# Patient Record
Sex: FEMALE | Race: WHITE | NOT HISPANIC OR LATINO | URBAN - METROPOLITAN AREA
[De-identification: names, ages, dates, MRNs, and addresses within clinical notes are randomized per-mention and may not be internally consistent; named-entity substitution may affect disease eponyms.]

---

## 2017-04-04 ENCOUNTER — EMERGENCY (EMERGENCY)
Facility: HOSPITAL | Age: 47
LOS: 1 days | End: 2017-04-04
Payer: COMMERCIAL

## 2017-04-04 PROCEDURE — 99282 EMERGENCY DEPT VISIT SF MDM: CPT

## 2017-04-18 ENCOUNTER — OUTPATIENT (OUTPATIENT)
Dept: OUTPATIENT SERVICES | Facility: HOSPITAL | Age: 47
LOS: 1 days | Discharge: HOME | End: 2017-04-18

## 2017-04-29 ENCOUNTER — OUTPATIENT (OUTPATIENT)
Dept: OUTPATIENT SERVICES | Facility: HOSPITAL | Age: 47
LOS: 1 days | End: 2017-04-29
Payer: COMMERCIAL

## 2017-04-29 PROCEDURE — 73721 MRI JNT OF LWR EXTRE W/O DYE: CPT | Mod: 26,RT

## 2017-08-01 ENCOUNTER — OUTPATIENT (OUTPATIENT)
Dept: OUTPATIENT SERVICES | Facility: HOSPITAL | Age: 47
LOS: 1 days | End: 2017-08-01

## 2018-08-14 ENCOUNTER — APPOINTMENT (OUTPATIENT)
Dept: VASCULAR SURGERY | Facility: CLINIC | Age: 48
End: 2018-08-14
Payer: COMMERCIAL

## 2018-08-14 DIAGNOSIS — Z87.442 PERSONAL HISTORY OF URINARY CALCULI: ICD-10-CM

## 2018-08-14 PROCEDURE — 99214 OFFICE O/P EST MOD 30 MIN: CPT

## 2018-08-15 PROBLEM — Z87.442 HISTORY OF RENAL CALCULI: Status: RESOLVED | Noted: 2018-08-15 | Resolved: 2018-08-15

## 2018-08-15 RX ORDER — OXYBUTYNIN CHLORIDE 15 MG/1
15 TABLET, EXTENDED RELEASE ORAL
Refills: 0 | Status: ACTIVE | COMMUNITY

## 2018-08-15 RX ORDER — ASPIRIN 81 MG
81 TABLET, DELAYED RELEASE (ENTERIC COATED) ORAL
Refills: 0 | Status: ACTIVE | COMMUNITY

## 2019-04-09 ENCOUNTER — APPOINTMENT (OUTPATIENT)
Dept: VASCULAR SURGERY | Facility: CLINIC | Age: 49
End: 2019-04-09
Payer: COMMERCIAL

## 2019-04-09 PROCEDURE — 99214 OFFICE O/P EST MOD 30 MIN: CPT

## 2019-04-10 ENCOUNTER — OUTPATIENT (OUTPATIENT)
Dept: OUTPATIENT SERVICES | Facility: HOSPITAL | Age: 49
LOS: 1 days | Discharge: HOME | End: 2019-04-10

## 2019-04-15 NOTE — REVIEW OF SYSTEMS
[As Noted in HPI] : as noted in HPI [Difficulty Walking] : difficulty walking [Negative] : Heme/Lymph [Leg Claudication] : no intermittent leg claudication [Lower Ext Edema] : no lower extremity edema [Limb Pain] : no limb pain [Limb Swelling] : no limb swelling

## 2019-04-15 NOTE — HISTORY OF PRESENT ILLNESS
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  Over the last few years Mrs. Mccauley's walking has become more difficult due to loss of ROM in the ankle and contraction of the R knee.  Pt has met w/a prosthetist (Zoe), and is ready to plan her R BKA.\par \par She is doing well otherwise, she is in good health, no other medical problems.

## 2019-04-15 NOTE — ASSESSMENT
[Arterial/Venous Disease] : arterial/venous disease [FreeTextEntry1] : 48yoF w/h/o RLE AVM, s/p multiple embolizations, but now reporting deterioration of the RLE regarding strength/ROM, and reports worsening difficulty w/walking.  She is unable to use any higher lifts in the LLE, so her limb length discrepancy has diminished her capacity to walk/exercise. Will plan for R BKA 05/23/2019.\par \par I personally discussed this patient with the Physician Assistant at the time of the visit. I agree with the assessment and plan as written

## 2019-04-15 NOTE — PHYSICAL EXAM
[Normal Breath Sounds] : Normal breath sounds [Normal Heart Sounds] : normal heart sounds [2+] : left 2+ [Calm] : calm [Alert] : alert [JVD] : no jugular venous distention  [Ankle Swelling (On Exam)] : not present [Varicose Veins Of Lower Extremities] : not present [] : not present [Abdomen Tenderness] : ~T ~M No abdominal tenderness [de-identified] : WN/WD, NAD [de-identified] : NC/AT [de-identified] : R LE: decreased ROM of ankle/foot [de-identified] : R LE: + multiple AVMs over thigh and lower leg

## 2019-04-25 ENCOUNTER — OUTPATIENT (OUTPATIENT)
Dept: OUTPATIENT SERVICES | Facility: HOSPITAL | Age: 49
LOS: 1 days | Discharge: HOME | End: 2019-04-25

## 2019-06-05 ENCOUNTER — RESULT REVIEW (OUTPATIENT)
Age: 49
End: 2019-06-05

## 2019-06-05 ENCOUNTER — APPOINTMENT (OUTPATIENT)
Dept: VASCULAR SURGERY | Facility: HOSPITAL | Age: 49
End: 2019-06-05

## 2019-06-05 ENCOUNTER — INPATIENT (INPATIENT)
Facility: HOSPITAL | Age: 49
LOS: 2 days | Discharge: NURSING, REHAB OR LONG-TERM CARE | DRG: 240 | End: 2019-06-08
Attending: SURGERY | Admitting: SURGERY
Payer: COMMERCIAL

## 2019-06-05 VITALS
HEIGHT: 61 IN | DIASTOLIC BLOOD PRESSURE: 65 MMHG | TEMPERATURE: 98 F | SYSTOLIC BLOOD PRESSURE: 126 MMHG | OXYGEN SATURATION: 100 % | WEIGHT: 108.03 LBS | HEART RATE: 63 BPM | RESPIRATION RATE: 16 BRPM

## 2019-06-05 DIAGNOSIS — N20.0 CALCULUS OF KIDNEY: Chronic | ICD-10-CM

## 2019-06-05 DIAGNOSIS — Z94.89 OTHER TRANSPLANTED ORGAN AND TISSUE STATUS: Chronic | ICD-10-CM

## 2019-06-05 LAB
ANION GAP SERPL CALC-SCNC: 13 MMOL/L — SIGNIFICANT CHANGE UP (ref 5–17)
BASOPHILS # BLD AUTO: 0.05 K/UL — SIGNIFICANT CHANGE UP (ref 0–0.2)
BASOPHILS NFR BLD AUTO: 0.8 % — SIGNIFICANT CHANGE UP (ref 0–2)
BUN SERPL-MCNC: 15 MG/DL — SIGNIFICANT CHANGE UP (ref 7–23)
CALCIUM SERPL-MCNC: 9 MG/DL — SIGNIFICANT CHANGE UP (ref 8.4–10.5)
CHLORIDE SERPL-SCNC: 103 MMOL/L — SIGNIFICANT CHANGE UP (ref 96–108)
CO2 SERPL-SCNC: 20 MMOL/L — LOW (ref 22–31)
CREAT SERPL-MCNC: 0.53 MG/DL — SIGNIFICANT CHANGE UP (ref 0.5–1.3)
EOSINOPHIL # BLD AUTO: 0.04 K/UL — SIGNIFICANT CHANGE UP (ref 0–0.5)
EOSINOPHIL NFR BLD AUTO: 0.7 % — SIGNIFICANT CHANGE UP (ref 0–6)
GLUCOSE SERPL-MCNC: 88 MG/DL — SIGNIFICANT CHANGE UP (ref 70–99)
HCT VFR BLD CALC: 37.1 % — SIGNIFICANT CHANGE UP (ref 34.5–45)
HGB BLD-MCNC: 12.1 G/DL — SIGNIFICANT CHANGE UP (ref 11.5–15.5)
IMM GRANULOCYTES NFR BLD AUTO: 0.3 % — SIGNIFICANT CHANGE UP (ref 0–1.5)
LYMPHOCYTES # BLD AUTO: 0.46 K/UL — LOW (ref 1–3.3)
LYMPHOCYTES # BLD AUTO: 7.8 % — LOW (ref 13–44)
MAGNESIUM SERPL-MCNC: 2 MG/DL — SIGNIFICANT CHANGE UP (ref 1.6–2.6)
MCHC RBC-ENTMCNC: 31.5 PG — SIGNIFICANT CHANGE UP (ref 27–34)
MCHC RBC-ENTMCNC: 32.6 GM/DL — SIGNIFICANT CHANGE UP (ref 32–36)
MCV RBC AUTO: 96.6 FL — SIGNIFICANT CHANGE UP (ref 80–100)
MONOCYTES # BLD AUTO: 0.1 K/UL — SIGNIFICANT CHANGE UP (ref 0–0.9)
MONOCYTES NFR BLD AUTO: 1.7 % — LOW (ref 2–14)
NEUTROPHILS # BLD AUTO: 5.26 K/UL — SIGNIFICANT CHANGE UP (ref 1.8–7.4)
NEUTROPHILS NFR BLD AUTO: 88.7 % — HIGH (ref 43–77)
NRBC # BLD: 0 /100 WBCS — SIGNIFICANT CHANGE UP (ref 0–0)
PHOSPHATE SERPL-MCNC: 2.7 MG/DL — SIGNIFICANT CHANGE UP (ref 2.5–4.5)
PLATELET # BLD AUTO: 223 K/UL — SIGNIFICANT CHANGE UP (ref 150–400)
POTASSIUM SERPL-MCNC: 3.8 MMOL/L — SIGNIFICANT CHANGE UP (ref 3.5–5.3)
POTASSIUM SERPL-SCNC: 3.8 MMOL/L — SIGNIFICANT CHANGE UP (ref 3.5–5.3)
RBC # BLD: 3.84 M/UL — SIGNIFICANT CHANGE UP (ref 3.8–5.2)
RBC # FLD: 12.3 % — SIGNIFICANT CHANGE UP (ref 10.3–14.5)
SODIUM SERPL-SCNC: 136 MMOL/L — SIGNIFICANT CHANGE UP (ref 135–145)
WBC # BLD: 5.93 K/UL — SIGNIFICANT CHANGE UP (ref 3.8–10.5)
WBC # FLD AUTO: 5.93 K/UL — SIGNIFICANT CHANGE UP (ref 3.8–10.5)

## 2019-06-05 PROCEDURE — 27880 AMPUTATION OF LOWER LEG: CPT | Mod: GC

## 2019-06-05 RX ORDER — OXYBUTYNIN CHLORIDE 5 MG
1 TABLET ORAL
Qty: 0 | Refills: 0 | DISCHARGE

## 2019-06-05 RX ORDER — ASPIRIN/CALCIUM CARB/MAGNESIUM 324 MG
1 TABLET ORAL
Qty: 0 | Refills: 0 | DISCHARGE

## 2019-06-05 RX ORDER — HYDROMORPHONE HYDROCHLORIDE 2 MG/ML
0.2 INJECTION INTRAMUSCULAR; INTRAVENOUS; SUBCUTANEOUS
Refills: 0 | Status: DISCONTINUED | OUTPATIENT
Start: 2019-06-05 | End: 2019-06-07

## 2019-06-05 RX ORDER — OXYBUTYNIN CHLORIDE 5 MG
5 TABLET ORAL THREE TIMES A DAY
Refills: 0 | Status: DISCONTINUED | OUTPATIENT
Start: 2019-06-05 | End: 2019-06-08

## 2019-06-05 RX ORDER — SODIUM CHLORIDE 9 MG/ML
1000 INJECTION, SOLUTION INTRAVENOUS
Refills: 0 | Status: DISCONTINUED | OUTPATIENT
Start: 2019-06-05 | End: 2019-06-06

## 2019-06-05 RX ORDER — OXYCODONE AND ACETAMINOPHEN 5; 325 MG/1; MG/1
2 TABLET ORAL EVERY 6 HOURS
Refills: 0 | Status: DISCONTINUED | OUTPATIENT
Start: 2019-06-05 | End: 2019-06-08

## 2019-06-05 RX ORDER — OXYCODONE AND ACETAMINOPHEN 5; 325 MG/1; MG/1
1 TABLET ORAL EVERY 4 HOURS
Refills: 0 | Status: DISCONTINUED | OUTPATIENT
Start: 2019-06-05 | End: 2019-06-08

## 2019-06-05 RX ORDER — DOCUSATE SODIUM 100 MG
100 CAPSULE ORAL THREE TIMES A DAY
Refills: 0 | Status: DISCONTINUED | OUTPATIENT
Start: 2019-06-05 | End: 2019-06-08

## 2019-06-05 RX ORDER — ASPIRIN/CALCIUM CARB/MAGNESIUM 324 MG
81 TABLET ORAL DAILY
Refills: 0 | Status: DISCONTINUED | OUTPATIENT
Start: 2019-06-05 | End: 2019-06-08

## 2019-06-05 RX ORDER — HYDROMORPHONE HYDROCHLORIDE 2 MG/ML
0.5 INJECTION INTRAMUSCULAR; INTRAVENOUS; SUBCUTANEOUS EVERY 4 HOURS
Refills: 0 | Status: DISCONTINUED | OUTPATIENT
Start: 2019-06-05 | End: 2019-06-08

## 2019-06-05 RX ORDER — BUPIVACAINE 13.3 MG/ML
20 INJECTION, SUSPENSION, LIPOSOMAL INFILTRATION ONCE
Refills: 0 | Status: DISCONTINUED | OUTPATIENT
Start: 2019-06-05 | End: 2019-06-08

## 2019-06-05 RX ADMIN — Medication 100 MILLIGRAM(S): at 21:59

## 2019-06-05 NOTE — H&P PST ADULT - HISTORY OF PRESENT ILLNESS
Attending: Ainsley    HPI: 49 F with a congenital AVM of the RLE s/p multiple embolizations, presents for elective R BKA. She reports deterioration of the RLE regarding strength/ROM and difficulty w/walking. She is unable to use any higher lifts in the LLE, so her limb length discrepancy has diminished her capacity to walk/exercise. She is otherwise in her normal state of health and denies fever, chills, chest pain, dyspnea, abdominal pain, N/V/D/C, or dysuria.       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, b/l LE  Renal stones: right  Grafting: multiple skin on bilateral lowe extremities  Other postprocedural status: Lt foot wound debridement 2010      MEDICATIONS:      Allergies: No Known Allergies    Intolerances        SOCIAL HISTORY: non smoker    FAMILY HISTORY: unremarkable      REVIEW OF SYSTEMS: negative as per HPI    Vital Signs Last 24 Hrs  T(C): 36.5 (05 Jun 2019 12:23), Max: 36.5 (05 Jun 2019 12:23)  T(F): 97.7 (05 Jun 2019 12:23), Max: 97.7 (05 Jun 2019 12:23)  HR: 63 (05 Jun 2019 12:23) (63 - 63)  BP: 126/65 (05 Jun 2019 12:23) (126/65 - 126/65)  BP(mean): --  RR: 16 (05 Jun 2019 12:23) (16 - 16)  SpO2: 100% (05 Jun 2019 12:23) (100% - 100%)    Physical Exam:  - NAD, alert, interactive, appears well  - non-labored breathing on room air  - bilateral lower extremities warm, well perfused, 2+ pedal pulses  - RLE with multiple AVMs over thigh and lower leg, limited ROM in foot    LABS: pre op labs reviewed       RADIOLOGY & ADDITIONAL STUDIES: n/a      Assessment: 49 F with congenital AVM of RLE and loss of function in foot, presents for R BKA    - admit to Vascular Surgery telemetry floor  - Percocet PRN for pain, Dilaudid for breakthrough pain post-op  - continue home medications  - regular diet post-op  - possible discharge over the weekend if doing well

## 2019-06-05 NOTE — PROGRESS NOTE ADULT - SUBJECTIVE AND OBJECTIVE BOX
STATUS POST:  R BKA    SUBJECTIVE: Pt seen and examined bedside. patient states that pain is well controlled. no complaints at this time     Vital Signs Last 24 Hrs  T(C): 37.2 (05 Jun 2019 22:17), Max: 37.2 (05 Jun 2019 22:17)  T(F): 98.9 (05 Jun 2019 22:17), Max: 98.9 (05 Jun 2019 22:17)  HR: 84 (05 Jun 2019 21:52) (63 - 90)  BP: 129/59 (05 Jun 2019 21:52) (120/63 - 144/65)  BP(mean): 91 (05 Jun 2019 21:00) (86 - 95)  RR: 16 (05 Jun 2019 21:52) (14 - 27)  SpO2: 97% (05 Jun 2019 21:52) (97% - 100%)  I&O's Summary    05 Jun 2019 07:01  -  05 Jun 2019 22:58  --------------------------------------------------------  IN: 550 mL / OUT: 400 mL / NET: 150 mL      I&O's Detail    05 Jun 2019 07:01  -  05 Jun 2019 22:58  --------------------------------------------------------  IN:    lactated ringers.: 320 mL    Oral Fluid: 150 mL    Other: 80 mL  Total IN: 550 mL    OUT:    Voided: 400 mL  Total OUT: 400 mL    Total NET: 150 mL            LABS:                        12.1   5.93  )-----------( 223      ( 05 Jun 2019 20:03 )             37.1     06-05    136  |  103  |  15  ----------------------------<  88  3.8   |  20<L>  |  0.53    Ca    9.0      05 Jun 2019 20:03  Phos  2.7     06-05  Mg     2.0     06-05            Physical exam :  Neuro: Alert and Oriented X 4  Respiratory: CTA b/l. no respiratory distress  Cardiovascular: NSR, RRR  Gastrointestinal: soft, NT/ND  Extremities: s/p Right BKA, dressing in place, dressing c/d/i, no signs of bleeding, WWP        A/P: 49y Female   - Diet: regular diet  - Activity: PT eval  - Labs: AM labs  - Pain medication  - DVT ppx

## 2019-06-06 LAB
ANION GAP SERPL CALC-SCNC: 8 MMOL/L — SIGNIFICANT CHANGE UP (ref 5–17)
BUN SERPL-MCNC: 15 MG/DL — SIGNIFICANT CHANGE UP (ref 7–23)
CALCIUM SERPL-MCNC: 8.7 MG/DL — SIGNIFICANT CHANGE UP (ref 8.4–10.5)
CHLORIDE SERPL-SCNC: 105 MMOL/L — SIGNIFICANT CHANGE UP (ref 96–108)
CO2 SERPL-SCNC: 24 MMOL/L — SIGNIFICANT CHANGE UP (ref 22–31)
CREAT SERPL-MCNC: 0.54 MG/DL — SIGNIFICANT CHANGE UP (ref 0.5–1.3)
GLUCOSE SERPL-MCNC: 151 MG/DL — HIGH (ref 70–99)
HCT VFR BLD CALC: 36.5 % — SIGNIFICANT CHANGE UP (ref 34.5–45)
HGB BLD-MCNC: 11.4 G/DL — LOW (ref 11.5–15.5)
MAGNESIUM SERPL-MCNC: 2.1 MG/DL — SIGNIFICANT CHANGE UP (ref 1.6–2.6)
MCHC RBC-ENTMCNC: 31 PG — SIGNIFICANT CHANGE UP (ref 27–34)
MCHC RBC-ENTMCNC: 31.2 GM/DL — LOW (ref 32–36)
MCV RBC AUTO: 99.2 FL — SIGNIFICANT CHANGE UP (ref 80–100)
NRBC # BLD: 0 /100 WBCS — SIGNIFICANT CHANGE UP (ref 0–0)
PHOSPHATE SERPL-MCNC: 2.2 MG/DL — LOW (ref 2.5–4.5)
PLATELET # BLD AUTO: 232 K/UL — SIGNIFICANT CHANGE UP (ref 150–400)
POTASSIUM SERPL-MCNC: 4.6 MMOL/L — SIGNIFICANT CHANGE UP (ref 3.5–5.3)
POTASSIUM SERPL-SCNC: 4.6 MMOL/L — SIGNIFICANT CHANGE UP (ref 3.5–5.3)
RBC # BLD: 3.68 M/UL — LOW (ref 3.8–5.2)
RBC # FLD: 12.5 % — SIGNIFICANT CHANGE UP (ref 10.3–14.5)
SODIUM SERPL-SCNC: 137 MMOL/L — SIGNIFICANT CHANGE UP (ref 135–145)
WBC # BLD: 9.24 K/UL — SIGNIFICANT CHANGE UP (ref 3.8–10.5)
WBC # FLD AUTO: 9.24 K/UL — SIGNIFICANT CHANGE UP (ref 3.8–10.5)

## 2019-06-06 RX ORDER — SODIUM,POTASSIUM PHOSPHATES 278-250MG
1 POWDER IN PACKET (EA) ORAL
Refills: 0 | Status: COMPLETED | OUTPATIENT
Start: 2019-06-06 | End: 2019-06-07

## 2019-06-06 RX ORDER — HEPARIN SODIUM 5000 [USP'U]/ML
5000 INJECTION INTRAVENOUS; SUBCUTANEOUS EVERY 12 HOURS
Refills: 0 | Status: DISCONTINUED | OUTPATIENT
Start: 2019-06-06 | End: 2019-06-08

## 2019-06-06 RX ADMIN — Medication 100 MILLIGRAM(S): at 05:41

## 2019-06-06 RX ADMIN — Medication 100 MILLIGRAM(S): at 20:43

## 2019-06-06 RX ADMIN — HEPARIN SODIUM 5000 UNIT(S): 5000 INJECTION INTRAVENOUS; SUBCUTANEOUS at 17:59

## 2019-06-06 RX ADMIN — Medication 5 MILLIGRAM(S): at 20:43

## 2019-06-06 RX ADMIN — Medication 5 MILLIGRAM(S): at 13:54

## 2019-06-06 RX ADMIN — Medication 100 MILLIGRAM(S): at 13:55

## 2019-06-06 RX ADMIN — Medication 81 MILLIGRAM(S): at 11:58

## 2019-06-06 RX ADMIN — Medication 1 PACKET(S): at 17:59

## 2019-06-06 RX ADMIN — Medication 5 MILLIGRAM(S): at 05:41

## 2019-06-06 NOTE — OCCUPATIONAL THERAPY INITIAL EVALUATION ADULT - VISUAL ACUITY
Patient wears glasses for reading, not worn during eval, denies diplopia or blurry vision, able to correctly ready clock ~10ft away.

## 2019-06-06 NOTE — PHYSICAL THERAPY INITIAL EVALUATION ADULT - CRITERIA FOR SKILLED THERAPEUTIC INTERVENTIONS
impairments found/anticipated discharge recommendation/therapy frequency/rehab potential/anticipated equipment needs at discharge

## 2019-06-06 NOTE — OCCUPATIONAL THERAPY INITIAL EVALUATION ADULT - MD ORDER
Per chart, 49 F with a congenital AVM of the RLE s/p multiple embolizations, presents for elective R BKA. She reports deterioration of the RLE regarding strength/ROM and difficulty w/walking. She is unable to use any higher lifts in the LLE, so her limb length discrepancy has diminished her capacity to walk/exercise. She is otherwise in her normal state of health and denies fever, chills, chest pain, dyspnea, abdominal pain, N/V/D/C, or dysuria.

## 2019-06-06 NOTE — PROGRESS NOTE ADULT - SUBJECTIVE AND OBJECTIVE BOX
O/N: s/p Right BKA, POC wnl          49 F with a congenital AVM of the RLE s/p multiple embolizations, presents for elective R BKA    - regular diet, IVF  - ASA  - pain control  - AM labs   - keep dressing x 5 days O/N: s/p Right BKA, POC wnl     SUBJECTIVE: This morning, she feels well; her pain is well-controlled. No nausea or vomiting. No acute complaints.    aspirin enteric coated 81 milliGRAM(s) Oral daily      Vital Signs Last 24 Hrs  T(C): 36.8 (06 Jun 2019 05:23), Max: 37.2 (05 Jun 2019 22:17)  T(F): 98.2 (06 Jun 2019 05:23), Max: 98.9 (05 Jun 2019 22:17)  HR: 56 (06 Jun 2019 04:50) (56 - 90)  BP: 93/53 (06 Jun 2019 04:50) (93/53 - 144/65)  BP(mean): 91 (05 Jun 2019 21:00) (86 - 95)  RR: 16 (06 Jun 2019 04:50) (14 - 27)  SpO2: 96% (06 Jun 2019 04:50) (96% - 100%)  I&O's Detail    05 Jun 2019 07:01  -  06 Jun 2019 07:00  --------------------------------------------------------  IN:    lactated ringers.: 880 mL    Oral Fluid: 150 mL    Other: 80 mL  Total IN: 1110 mL    OUT:    Voided: 400 mL  Total OUT: 400 mL    Total NET: 710 mL          General: NAD, resting comfortably in bed  Pulm: Nonlabored breathing, no respiratory distress  Extrem: WWP, no edema, R BKA dressing CDI without visible erythema, no foul smell      LABS:                        12.1   5.93  )-----------( 223      ( 05 Jun 2019 20:03 )             37.1     06-05    136  |  103  |  15  ----------------------------<  88  3.8   |  20<L>  |  0.53    Ca    9.0      05 Jun 2019 20:03  Phos  2.7     06-05  Mg     2.0     06-05        49F PMHx congenital RLE AVM s/p multiple failed embolizations, s/p R BKA (6/5).    pain/nausea control  aspirin  IS/OOB  regular  SCDs, no HSQ  keep dressings until 6/10

## 2019-06-06 NOTE — OCCUPATIONAL THERAPY INITIAL EVALUATION ADULT - ADDITIONAL COMMENTS
Patient reports full independence PTA, reports using b/l heel lifts for ambulation but otherwise denies use of AE/AD.

## 2019-06-06 NOTE — PHYSICAL THERAPY INITIAL EVALUATION ADULT - ADDITIONAL COMMENTS
Patient reports that she lives in a ranch-style home with family. Was using heel lifts in both shoes for ambulation prior to surgery, however no assistive device. Independent with ADLs.

## 2019-06-06 NOTE — OCCUPATIONAL THERAPY INITIAL EVALUATION ADULT - GENERAL OBSERVATIONS, REHAB EVAL
Patient right hand dominant. Chart reviewed, ROSE MARY Mclaughlin cleared patient for OT evaluation. Patient received walking to bathroom with RW and aide, NAD, sister in room, +IV heplock, +tele.

## 2019-06-06 NOTE — PHYSICAL THERAPY INITIAL EVALUATION ADULT - GENERAL OBSERVATIONS, REHAB EVAL
Patient received supine in bed with + ACE wrap to (R) LE, heplock IV, tele, room air.  present at end of session.

## 2019-06-06 NOTE — OCCUPATIONAL THERAPY INITIAL EVALUATION ADULT - PLANNED THERAPY INTERVENTIONS, OT EVAL
strengthening/prosthetic fitting/training/transfer training/ADL retraining/balance training/bed mobility training

## 2019-06-07 ENCOUNTER — TRANSCRIPTION ENCOUNTER (OUTPATIENT)
Age: 49
End: 2019-06-07

## 2019-06-07 LAB
HCT VFR BLD CALC: 32 % — LOW (ref 34.5–45)
HGB BLD-MCNC: 10.2 G/DL — LOW (ref 11.5–15.5)

## 2019-06-07 RX ORDER — DOCUSATE SODIUM 100 MG
1 CAPSULE ORAL
Qty: 15 | Refills: 0
Start: 2019-06-07 | End: 2019-06-11

## 2019-06-07 RX ADMIN — HEPARIN SODIUM 5000 UNIT(S): 5000 INJECTION INTRAVENOUS; SUBCUTANEOUS at 18:04

## 2019-06-07 RX ADMIN — HEPARIN SODIUM 5000 UNIT(S): 5000 INJECTION INTRAVENOUS; SUBCUTANEOUS at 05:44

## 2019-06-07 RX ADMIN — Medication 100 MILLIGRAM(S): at 21:17

## 2019-06-07 RX ADMIN — Medication 100 MILLIGRAM(S): at 14:02

## 2019-06-07 RX ADMIN — Medication 81 MILLIGRAM(S): at 12:53

## 2019-06-07 RX ADMIN — Medication 5 MILLIGRAM(S): at 14:02

## 2019-06-07 RX ADMIN — OXYCODONE AND ACETAMINOPHEN 1 TABLET(S): 5; 325 TABLET ORAL at 14:00

## 2019-06-07 RX ADMIN — Medication 1 PACKET(S): at 07:26

## 2019-06-07 RX ADMIN — Medication 5 MILLIGRAM(S): at 21:17

## 2019-06-07 RX ADMIN — OXYCODONE AND ACETAMINOPHEN 1 TABLET(S): 5; 325 TABLET ORAL at 03:00

## 2019-06-07 RX ADMIN — OXYCODONE AND ACETAMINOPHEN 1 TABLET(S): 5; 325 TABLET ORAL at 12:53

## 2019-06-07 RX ADMIN — Medication 5 MILLIGRAM(S): at 05:44

## 2019-06-07 RX ADMIN — Medication 100 MILLIGRAM(S): at 05:44

## 2019-06-07 RX ADMIN — OXYCODONE AND ACETAMINOPHEN 1 TABLET(S): 5; 325 TABLET ORAL at 01:32

## 2019-06-07 NOTE — DISCHARGE NOTE PROVIDER - NSDCFUADDINST_GEN_ALL_CORE_FT
-Please follow up with Dr. Schmitz on TUESDAY for dressing change. Call the office to schedule an appointment: [phone].  -Continue eating your regular diet as tolerated and drinking plenty of fluids.   -For pain, you may take over-the-counter Tylenol as labeled. For breakthrough pain you may take Percocet, which contains Tylenol. Do NOT exceed 4000mg acetaminophen/Tylenol total within 24 hours. Do NOT take Advil, Motrin, or NSAIDs. Please take Colace 1 tab twice daily while taking narcotic pain medication to avoid constipation.  -DO NOT REMOVE YOUR ACE WRAP. Please keep wounds clean and dry.   -You may shower but NO baths and NO swimming. Keep your incisions clean & dry. Please put a plastic bag over your dressings to keep them dry. -Please follow up with Dr. Schmitz on TUESDAY for dressing change. Call the office to schedule an appointment: [phone].  -Continue eating your regular diet as tolerated and drinking plenty of fluids.   -For pain, you may take over-the-counter Tylenol as labeled. For breakthrough pain you may take Percocet, which contains Tylenol. Do NOT exceed 4000mg acetaminophen/Tylenol total within 24 hours. Do NOT take Advil, Motrin, or NSAIDs. Please take Colace 1 tab twice daily while taking narcotic pain medication to avoid constipation.  -DO NOT REMOVE YOUR ACE WRAP. Please keep wounds clean and dry.   -You may shower but NO baths and NO swimming. Keep your incisions clean & dry. Please put a plastic bag over your dressings to keep them dry.  - Cleared for private transport to Acute Rehab

## 2019-06-07 NOTE — DISCHARGE NOTE PROVIDER - NSDCCPCAREPLAN_GEN_ALL_CORE_FT
PRINCIPAL DISCHARGE DIAGNOSIS  Diagnosis: AVM (arteriovenous malformation)  Assessment and Plan of Treatment:

## 2019-06-07 NOTE — DISCHARGE NOTE PROVIDER - HOSPITAL COURSE
49 F with a congenital AVM of the RLE s/p multiple embolizations, presents for elective R BKA. She reports deterioration of the RLE regarding strength/ROM and difficulty w/walking. She is unable to use any higher lifts in the LLE, so her limb length discrepancy has diminished her capacity to walk/exercise. She is otherwise in her normal state of health and denies fever, chills, chest pain, dyspnea, abdominal pain, N/V/D/C, or dysuria. On admission, she was afebrile with 2+ pedal pulses. She was taken to the OR for a right BKA which was uncomplicated. Postoperatively, there were no complications. Her pain was well-controlled. Her diet was advanced as tolerated. She was discharged to acute rehab with recommendations to follow up with Dr. Schmitz on Tuesday for wound check and dressing change. 49 F with a congenital AVM of the RLE s/p multiple embolizations, presents for elective R BKA. She reports deterioration of the RLE regarding strength/ROM and difficulty w/walking. She is unable to use any higher lifts in the LLE, so her limb length discrepancy has diminished her capacity to walk/exercise. She is otherwise in her normal state of health and denies fever, chills, chest pain, dyspnea, abdominal pain, N/V/D/C, or dysuria. On admission, she was afebrile with 2+ pedal pulses. She was taken to the OR for a right BKA which was uncomplicated. Postoperatively, there were no complications. Her pain was well-controlled. Her diet was advanced as tolerated. She was discharged to acute rehab with recommendations to follow up with Dr. Schmitz on Tuesday for wound check and dressing change. Cleared for private transport to Acute Rehab.

## 2019-06-07 NOTE — DISCHARGE NOTE PROVIDER - CARE PROVIDER_API CALL
Krystina Schmitz)  Surgery; Vascular Surgery  [address] [place] [address]  Phone: [phone]  Fax: [phone]  Follow Up Time: 1 week

## 2019-06-07 NOTE — PROGRESS NOTE ADULT - SUBJECTIVE AND OBJECTIVE BOX
O/N: JOSE J  6/6: SQH started today, walked with walker              49F PMHx congenital RLE AVM s/p multiple failed embolizations, s/p R BKA (6/5).    pain/nausea control  aspirin  IS/OOB  regular  SCDs, no HSQ  keep dressings until 6/10 O/N: JOSE J  6/6: SQH started today, walked with walker     SUBJECTIVE: This morning, she feels well; her pain is well-controlled. No nausea or vomiting. No acute complaints.    aspirin enteric coated 81 milliGRAM(s) Oral daily  heparin  Injectable 5000 Unit(s) SubCutaneous every 12 hours      Vital Signs Last 24 Hrs  T(C): 37.3 (07 Jun 2019 10:21), Max: 37.3 (07 Jun 2019 10:21)  T(F): 99.2 (07 Jun 2019 10:21), Max: 99.2 (07 Jun 2019 10:21)  HR: 77 (07 Jun 2019 08:50) (60 - 80)  BP: 103/53 (07 Jun 2019 08:50) (101/49 - 127/59)  BP(mean): --  RR: 16 (07 Jun 2019 08:50) (16 - 16)  SpO2: 100% (07 Jun 2019 08:50) (99% - 100%)  I&O's Detail    06 Jun 2019 07:01  -  07 Jun 2019 07:00  --------------------------------------------------------  IN:    Oral Fluid: 480 mL  Total IN: 480 mL    OUT:  Total OUT: 0 mL    Total NET: 480 mL      07 Jun 2019 07:01  -  07 Jun 2019 11:48  --------------------------------------------------------  IN:    Oral Fluid: 180 mL  Total IN: 180 mL    OUT:  Total OUT: 0 mL    Total NET: 180 mL          General: NAD, resting comfortably in bed  Pulm: Nonlabored breathing, no respiratory distress  Extrem: WWP, no edema, ACE wrap in place on R BKA site, no bleeding visible, able to straighten leg        LABS:                        10.2   x     )-----------( x        ( 07 Jun 2019 06:43 )             32.0     06-06    137  |  105  |  15  ----------------------------<  151<H>  4.6   |  24  |  0.54    Ca    8.7      06 Jun 2019 07:43  Phos  2.2     06-06  Mg     2.1     06-06        49F PMHx congenital RLE AVM s/p multiple failed embolizations, s/p R BKA (6/5).    pain/nausea control  aspirin  IS/OOB  regular  SCDs, no HSQ  keep dressings until 6/10

## 2019-06-08 ENCOUNTER — TRANSCRIPTION ENCOUNTER (OUTPATIENT)
Age: 49
End: 2019-06-08

## 2019-06-08 VITALS — TEMPERATURE: 98 F

## 2019-06-08 PROCEDURE — 85027 COMPLETE CBC AUTOMATED: CPT

## 2019-06-08 PROCEDURE — 85018 HEMOGLOBIN: CPT

## 2019-06-08 PROCEDURE — 83735 ASSAY OF MAGNESIUM: CPT

## 2019-06-08 PROCEDURE — 88307 TISSUE EXAM BY PATHOLOGIST: CPT

## 2019-06-08 PROCEDURE — 85014 HEMATOCRIT: CPT

## 2019-06-08 PROCEDURE — 97161 PT EVAL LOW COMPLEX 20 MIN: CPT

## 2019-06-08 PROCEDURE — 88311 DECALCIFY TISSUE: CPT

## 2019-06-08 PROCEDURE — 97162 PT EVAL MOD COMPLEX 30 MIN: CPT

## 2019-06-08 PROCEDURE — 36415 COLL VENOUS BLD VENIPUNCTURE: CPT

## 2019-06-08 PROCEDURE — 85025 COMPLETE CBC W/AUTO DIFF WBC: CPT

## 2019-06-08 PROCEDURE — 84100 ASSAY OF PHOSPHORUS: CPT

## 2019-06-08 PROCEDURE — 97110 THERAPEUTIC EXERCISES: CPT

## 2019-06-08 PROCEDURE — 97116 GAIT TRAINING THERAPY: CPT

## 2019-06-08 PROCEDURE — 80048 BASIC METABOLIC PNL TOTAL CA: CPT

## 2019-06-08 RX ADMIN — Medication 81 MILLIGRAM(S): at 09:09

## 2019-06-08 RX ADMIN — Medication 5 MILLIGRAM(S): at 06:29

## 2019-06-08 RX ADMIN — Medication 100 MILLIGRAM(S): at 06:29

## 2019-06-08 RX ADMIN — HEPARIN SODIUM 5000 UNIT(S): 5000 INJECTION INTRAVENOUS; SUBCUTANEOUS at 06:29

## 2019-06-08 NOTE — PROGRESS NOTE ADULT - SUBJECTIVE AND OBJECTIVE BOX
O/N; JOSE J   6/7: JOSE J                49F PMHx congenital RLE AVM s/p multiple failed embolizations, s/p R BKA (6/5).    pain/nausea control  aspirin  IS/OOB  regular  SCDs, no HSQ  keep dressings until 6/10  discharge 6/8 24 hr events  O/N; JOSE J   6/7: JOSE J    Subjective:  No acute complaints. Denies pain. Ready to go to rehab.    Vital Signs Last 24 Hrs  T(C): 37.2 (08 Jun 2019 05:18), Max: 37.5 (07 Jun 2019 17:27)  T(F): 98.9 (08 Jun 2019 05:18), Max: 99.5 (07 Jun 2019 17:27)  HR: 92 (08 Jun 2019 08:42) (66 - 92)  BP: 106/53 (08 Jun 2019 08:42) (94/51 - 128/57)  BP(mean): 76 (08 Jun 2019 08:42) (76 - 76)  RR: 15 (08 Jun 2019 08:42) (15 - 16)  SpO2: 99% (08 Jun 2019 08:42) (99% - 100%)    I&O's Summary  07 Jun 2019 07:01  -  08 Jun 2019 07:00  --------------------------------------------------------  IN: 360 mL / OUT: 0 mL / NET: 360 mL    Physical Exam:  General: NAD, resting comfortably  Pulmonary: normal resp effort  Extremities: RLE BKA dressing c/d/i  Neuro: A/O x 3    LABS:                      10.2   x     )-----------( x        ( 07 Jun 2019 06:43 )             32.0    49F PMHx congenital RLE AVM s/p multiple failed embolizations, s/p R BKA (6/5).    pain/nausea control  aspirin  IS/OOB  regular  SCDs, no HSQ  keep dressings until 6/10  discharge today

## 2019-06-08 NOTE — DISCHARGE NOTE NURSING/CASE MANAGEMENT/SOCIAL WORK - NSDCDPATPORTLINK_GEN_ALL_CORE
You can access the [facility], offered by [facility], by registering with the following website: [email or web address]

## 2019-06-10 PROBLEM — A69.20 LYME DISEASE, UNSPECIFIED: Chronic | Status: ACTIVE | Noted: 2019-06-04

## 2019-06-10 PROBLEM — N20.0 CALCULUS OF KIDNEY: Chronic | Status: ACTIVE | Noted: 2019-06-04

## 2019-06-10 PROBLEM — Q27.30 ARTERIOVENOUS MALFORMATION, SITE UNSPECIFIED: Chronic | Status: ACTIVE | Noted: 2019-06-04

## 2019-06-10 PROBLEM — R32 UNSPECIFIED URINARY INCONTINENCE: Chronic | Status: ACTIVE | Noted: 2019-06-04

## 2019-06-11 ENCOUNTER — APPOINTMENT (OUTPATIENT)
Dept: VASCULAR SURGERY | Facility: CLINIC | Age: 49
End: 2019-06-11
Payer: COMMERCIAL

## 2019-06-11 PROCEDURE — 99024 POSTOP FOLLOW-UP VISIT: CPT

## 2019-06-12 DIAGNOSIS — A69.20 LYME DISEASE, UNSPECIFIED: ICD-10-CM

## 2019-06-12 DIAGNOSIS — Q27.32 ARTERIOVENOUS MALFORMATION OF VESSEL OF LOWER LIMB: ICD-10-CM

## 2019-06-17 LAB — SURGICAL PATHOLOGY STUDY: SIGNIFICANT CHANGE UP

## 2019-06-18 NOTE — DISCUSSION/SUMMARY
[FreeTextEntry1] : Pt s/p R BKA, staple line well-approximated w/o any current issues, will return in 3wks for staple removal and clearance for  application.  Recommend to continue daily PT, and cleanse site w/peroxide qod and apply xeroform/dry gauze/ACE qod.\par \par I personally discussed this patient with the Physician Assistant at the time of the visit. I agree with the assessment and plan as written

## 2019-06-18 NOTE — REASON FOR VISIT
[Spouse] : spouse [de-identified] : RICH VALLE [de-identified] : 06/05/2019 [de-identified] : 1 [de-identified] : Pt s/p R BKA for chronic AVM, limb-length discrepancy, and recurrent LE ulcers.  POC uneventful as per pt, but she reports that the dressing has saturated over the past week.  She is currently in subacute rehab at [facility], progressing well w/exercises. [de-identified] : 5

## 2019-06-18 NOTE — PHYSICAL EXAM
[Normal Breath Sounds] : Normal breath sounds [Normal Heart Sounds] : normal heart sounds [2+] : left 2+ [Calm] : calm [Alert] : alert [JVD] : no jugular venous distention  [Ankle Swelling (On Exam)] : not present [Varicose Veins Of Lower Extremities] : not present [Petechiae] : no petechiae [Purpura] : no purpura  [Abdomen Tenderness] : ~T ~M No abdominal tenderness [] : not present [Skin Ulcer] : no ulcer [Skin Induration] : no induration [de-identified] : RLE BKA healthy appearance, FROM at knee [de-identified] : NC/AT [de-identified] : WN/WD, NAD [de-identified] : Staple line well-approximated w/dried/clotted blood at the surface, no erythema, no dehiscence

## 2019-06-27 ENCOUNTER — APPOINTMENT (OUTPATIENT)
Dept: VASCULAR SURGERY | Facility: CLINIC | Age: 49
End: 2019-06-27

## 2019-06-27 RX ORDER — SACCHAROMYCES BOULARDII 250 MG
250 CAPSULE ORAL TWICE DAILY
Qty: 28 | Refills: 0 | Status: ACTIVE | COMMUNITY
Start: 2019-06-27 | End: 1900-01-01

## 2019-07-09 ENCOUNTER — APPOINTMENT (OUTPATIENT)
Age: 49
End: 2019-07-09
Payer: COMMERCIAL

## 2019-07-09 PROCEDURE — 99024 POSTOP FOLLOW-UP VISIT: CPT

## 2019-07-16 NOTE — DISCUSSION/SUMMARY
[FreeTextEntry1] : Pt s/p R BKA, suture line approximated w/o any current issues, staples/sutures removed today at bedside.  Recommend to continue daily PT, and cleanse site w/peroxide qod and apply xeroform/dry gauze/ACE qod.  Pt will f/u in office in 6-8wks.\par \par I personally discussed this patient with the Physician Assistant at the time of the visit. I agree with the assessment and plan as written

## 2019-07-16 NOTE — REASON FOR VISIT
[Spouse] : spouse [de-identified] : RICH VALLE [de-identified] : 06/05/2019 [de-identified] : 34 [de-identified] : 4 [de-identified] : Pt s/p R BKA for chronic AVM, limb-length discrepancy, and recurrent LE ulcers.  POC uneventful as per pt, but she reports that the dressing has saturated over the past week.  She is currently in subacute rehab at [place], progressing well w/exercises.

## 2019-07-16 NOTE — PHYSICAL EXAM
[Normal Breath Sounds] : Normal breath sounds [Normal Heart Sounds] : normal heart sounds [2+] : left 2+ [Alert] : alert [Calm] : calm [Skin Ulcer] : ulcer [JVD] : no jugular venous distention  [Ankle Swelling (On Exam)] : not present [Varicose Veins Of Lower Extremities] : not present [] : not present [Abdomen Tenderness] : ~T ~M No abdominal tenderness [Purpura] : no purpura  [Petechiae] : no petechiae [Skin Induration] : no induration [de-identified] : WN/WD, NAD [de-identified] : NC/AT [de-identified] : RLE BKA healthy appearance, FROM at knee [de-identified] : Suture line approximated but some areas of dehiscence, no bone or tendon exposure and minimal drainage, no bleeding; no erythema of the stump

## 2019-07-30 ENCOUNTER — APPOINTMENT (OUTPATIENT)
Dept: VASCULAR SURGERY | Facility: CLINIC | Age: 49
End: 2019-07-30
Payer: COMMERCIAL

## 2019-07-30 PROCEDURE — 99024 POSTOP FOLLOW-UP VISIT: CPT

## 2019-08-06 NOTE — HISTORY OF PRESENT ILLNESS
[FreeTextEntry1] : 49yoF s/p R BKA for AVM w/ulcerations of the R foot and limb-length discrepancy; pt previously noted dehiscence of the surgical site, now healing w/xeroform/dry gauze qd.  Pt w/o any new complaints at this time.

## 2019-08-06 NOTE — ASSESSMENT
[FreeTextEntry1] : Pt s/p R BKA, suture line dehiscence healing w/daily xeroform application.  Recommend to continue daily PT, and cleanse site w/peroxide qod and apply xeroform/dry gauze/ACE qod.  Pt will f/u in office in 6wks.\par \par I personally discussed this patient with the Physician Assistant at the time of the visit. I agree with the assessment and plan as written

## 2019-08-06 NOTE — PHYSICAL EXAM
[Normal Breath Sounds] : Normal breath sounds [Normal Heart Sounds] : normal heart sounds [2+] : left 2+ [Skin Ulcer] : ulcer [Alert] : alert [Calm] : calm [JVD] : no jugular venous distention  [Ankle Swelling (On Exam)] : not present [] : not present [Varicose Veins Of Lower Extremities] : not present [Abdomen Tenderness] : ~T ~M No abdominal tenderness [Purpura] : no purpura  [Petechiae] : no petechiae [de-identified] : WN/WD, NAD [Skin Induration] : no induration [de-identified] : NC/AT [de-identified] : Suture line approximated but some areas of dehiscence, no bone or tendon exposure and minimal drainage, no bleeding; no erythema of the stump [de-identified] : RLE BKA healthy appearance, FROM at knee

## 2019-08-27 ENCOUNTER — APPOINTMENT (OUTPATIENT)
Dept: VASCULAR SURGERY | Facility: CLINIC | Age: 49
End: 2019-08-27
Payer: COMMERCIAL

## 2019-08-27 PROCEDURE — 99024 POSTOP FOLLOW-UP VISIT: CPT

## 2019-08-30 NOTE — PROCEDURE
[FreeTextEntry1] : Remaining sutures removed, exposed silk suture cut down, bacitracin/xeroform applied to suture line

## 2019-08-30 NOTE — ASSESSMENT
[FreeTextEntry1] : Pt s/p R BKA, suture line dehiscence healing w/daily xeroform application.  Recommend to continue daily PT, and cleanse site w/peroxide qod and apply xeroform/dry gauze/ACE qod.  She will begin  use qd and will f/u in office PRN.\par \par Rx provided for L foot accelerator.\par \par I personally discussed this patient with the Physician Assistant at the time of the visit. I agree with the assessment and plan as written

## 2019-08-30 NOTE — PHYSICAL EXAM
[Normal Heart Sounds] : normal heart sounds [Normal Breath Sounds] : Normal breath sounds [2+] : left 2+ [Skin Ulcer] : ulcer [Alert] : alert [Calm] : calm [JVD] : no jugular venous distention  [Ankle Swelling (On Exam)] : not present [Varicose Veins Of Lower Extremities] : not present [] : not present [Abdomen Tenderness] : ~T ~M No abdominal tenderness [Purpura] : no purpura  [Petechiae] : no petechiae [Skin Induration] : no induration [de-identified] : WN/WD, NAD [de-identified] : NC/AT [de-identified] : RLE BKA healthy appearance, FROM at knee [de-identified] : Suture line approximated but some areas of dehiscence, no bone or tendon exposure and minimal drainage, no bleeding; no erythema of the stump

## 2019-09-10 ENCOUNTER — APPOINTMENT (OUTPATIENT)
Dept: VASCULAR SURGERY | Facility: CLINIC | Age: 49
End: 2019-09-10
Payer: COMMERCIAL

## 2019-09-10 PROCEDURE — 99213 OFFICE O/P EST LOW 20 MIN: CPT

## 2019-09-10 NOTE — PHYSICAL EXAM
[Normal Breath Sounds] : Normal breath sounds [Normal Heart Sounds] : normal heart sounds [2+] : left 2+ [Alert] : alert [Calm] : calm [JVD] : no jugular venous distention  [Ankle Swelling (On Exam)] : not present [Varicose Veins Of Lower Extremities] : not present [] : not present [Abdomen Tenderness] : ~T ~M No abdominal tenderness [Petechiae] : no petechiae [Purpura] : no purpura  [Skin Ulcer] : no ulcer [de-identified] : WN/WD, NAD [Skin Induration] : no induration [de-identified] : NC/AT [de-identified] : TYRON MANA w/FROM, strength 5/5 at R knee [de-identified] : Suture line healed, scant drainage from the skin

## 2019-09-10 NOTE — HISTORY OF PRESENT ILLNESS
[FreeTextEntry1] : 49yoF s/p R BKA for AVM w/ulcerations of the R foot and limb-length discrepancy; pt previously noted dehiscence of the surgical site, now healing w/Aquaphor/dry gauze qd.  Pt w/o any new complaints at this time.

## 2019-09-10 NOTE — ASSESSMENT
[FreeTextEntry1] : Pt s/p R BKA, suture line dehiscence nearly fully healed.  Recommend to continue daily PT, and cleanse site w/peroxide qd and apply Aquaphor w/dry dressing.  Pt cleared to start  use and fitting for prosthetic.

## 2019-12-24 ENCOUNTER — APPOINTMENT (OUTPATIENT)
Dept: VASCULAR SURGERY | Facility: CLINIC | Age: 49
End: 2019-12-24
Payer: COMMERCIAL

## 2019-12-24 DIAGNOSIS — Q27.30 ARTERIOVENOUS MALFORMATION, SITE UNSPECIFIED: ICD-10-CM

## 2019-12-24 PROCEDURE — 99213 OFFICE O/P EST LOW 20 MIN: CPT

## 2019-12-24 NOTE — PHYSICAL EXAM
[JVD] : no jugular venous distention  [Normal Breath Sounds] : Normal breath sounds [Normal Heart Sounds] : normal heart sounds [2+] : left 2+ [Varicose Veins Of Lower Extremities] : not present [Ankle Swelling (On Exam)] : not present [] : not present [Purpura] : no purpura  [Abdomen Tenderness] : ~T ~M No abdominal tenderness [Petechiae] : no petechiae [Skin Ulcer] : ulcer [Skin Induration] : no induration [Alert] : alert [Calm] : calm [de-identified] : NC/AT [de-identified] : WN/WD, NAD [de-identified] : TYRON MANA w/FROM, strength 5/5 at R knee [de-identified] : Small pressure ulcer at the end of the stump, no erythema or drainage, no tenderness; 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

## 2019-12-24 NOTE — HISTORY OF PRESENT ILLNESS
[FreeTextEntry1] : 49yoF s/p R BKA for AVM w/ulcerations of the R foot and limb-length discrepancy, returns for evaluation of two ulcerated areas of skin which have not healed up over the past 2wks.  At the end of the BKA stump, pt reports a small ulcerated area of skin where she bumped her leg 3mos prior.  Second ulcer is at the medial aspect of the prosthetic shell.\par \par Pt denies copious drainage from the sites, and has been dressing daily.  Her prosthetist has been remolding the socket to allow for release of direct pressure on the ulcers.

## 2019-12-24 NOTE — ASSESSMENT
[FreeTextEntry1] : Pt s/p R BKA w/two pressure ulcers of the stump.  Pt will continue to dress the sites and pad them daily, and will observe them for improvement now that the prosthetic has been remolded.  Pt will f/u by phone and return to office PRN.

## 2020-01-14 ENCOUNTER — EMERGENCY (EMERGENCY)
Facility: HOSPITAL | Age: 50
LOS: 1 days | End: 2020-01-14
Admitting: EMERGENCY MEDICINE
Payer: COMMERCIAL

## 2020-01-14 DIAGNOSIS — Z94.89 OTHER TRANSPLANTED ORGAN AND TISSUE STATUS: Chronic | ICD-10-CM

## 2020-01-14 DIAGNOSIS — N20.0 CALCULUS OF KIDNEY: Chronic | ICD-10-CM

## 2020-01-14 PROCEDURE — 99283 EMERGENCY DEPT VISIT LOW MDM: CPT

## 2020-01-14 RX ORDER — AMOXICILLIN AND CLAVULANATE POTASSIUM 875; 125 MG/1; MG/1
875-125 TABLET, COATED ORAL
Qty: 28 | Refills: 0 | Status: ACTIVE | COMMUNITY
Start: 2019-06-27 | End: 1900-01-01

## 2020-01-16 ENCOUNTER — APPOINTMENT (OUTPATIENT)
Dept: VASCULAR SURGERY | Facility: CLINIC | Age: 50
End: 2020-01-16
Payer: COMMERCIAL

## 2020-01-16 DIAGNOSIS — L89.899 OTHER COMPLICATIONS OF AMPUTATION STUMP: ICD-10-CM

## 2020-01-16 DIAGNOSIS — M21.959 UNSPECIFIED ACQUIRED DEFORMITY OF UNSPECIFIED THIGH: ICD-10-CM

## 2020-01-16 DIAGNOSIS — T87.89 OTHER COMPLICATIONS OF AMPUTATION STUMP: ICD-10-CM

## 2020-01-16 DIAGNOSIS — M25.461 EFFUSION, RIGHT KNEE: ICD-10-CM

## 2020-01-16 PROCEDURE — 99213 OFFICE O/P EST LOW 20 MIN: CPT

## 2020-01-16 NOTE — REVIEW OF SYSTEMS
[Negative] : Endocrine [Lower Ext Edema] : lower extremity edema [As Noted in HPI] : as noted in HPI [Joint Swelling] : joint swelling [Limb Pain] : limb pain [Limb Swelling] : limb swelling

## 2020-01-16 NOTE — PHYSICAL EXAM
[Normal Heart Sounds] : normal heart sounds [Normal Breath Sounds] : Normal breath sounds [2+] : left 2+ [Skin Ulcer] : ulcer [Calm] : calm [Alert] : alert [JVD] : no jugular venous distention  [Varicose Veins Of Lower Extremities] : not present [] : not present [Ankle Swelling (On Exam)] : not present [Petechiae] : no petechiae [Abdomen Tenderness] : ~T ~M No abdominal tenderness [Purpura] : no purpura  [Skin Induration] : no induration [de-identified] : WN/WD, NAD [de-identified] : NC/AT [de-identified] : Incision well-healed, few punctate ulcers of the posterior stump w/serous drainage; +hyperemia noted in the distal stump, +blanching, non-tender [de-identified] : RLE BKA w/FROM, strength 5/5 at R knee, no palpable cords

## 2020-01-16 NOTE — HISTORY OF PRESENT ILLNESS
[FreeTextEntry1] : 49yoF s/p R BKA for AVM w/ulcerations of the R foot and limb-length discrepancy, returns for reevaluation of the stump as she went to a local ED 2d prior for redness and fullness in the BKA stump, associated w/throbbing pain throughout the R knee and stump.  Pt denies fevers, and was assessed in the ED but acute infection was not appreciated, though she was d/c'd home w/PO Augmentin x 1wk.\par \par Pt reports resolved swelling and pain at the knee, but only improved pain and fullness in the stump.  She has kept the prosthetic off for the past 2d.

## 2020-01-16 NOTE — ASSESSMENT
[FreeTextEntry1] : 49yoF s/p R BKA for AVM w/ulcerations of the R foot and limb-length discrepancy, returns for reevaluation of the stump as she went to a local ED 2d prior for redness and fullness in the BKA stump, associated w/throbbing pain throughout the R knee and stump.  Symptoms slowly resolving w/PO Augmentin, but symptoms appear to be musculoskeletal in origin, and may resolve w/time w/only NSAIDs/rest.\par \par Pt will complete Abx course and contact us for recurrent symptoms.

## 2020-01-17 PROBLEM — M25.461 SWELLING OF RIGHT KNEE JOINT: Status: ACTIVE | Noted: 2020-01-17

## 2020-01-18 ENCOUNTER — TRANSCRIPTION ENCOUNTER (OUTPATIENT)
Age: 50
End: 2020-01-18

## 2020-01-18 ENCOUNTER — APPOINTMENT (OUTPATIENT)
Dept: RADIOLOGY | Facility: CLINIC | Age: 50
End: 2020-01-18
Payer: COMMERCIAL

## 2020-01-18 PROCEDURE — 73564 X-RAY EXAM KNEE 4 OR MORE: CPT | Mod: RT

## 2022-05-13 ENCOUNTER — NON-APPOINTMENT (OUTPATIENT)
Age: 52
End: 2022-05-13

## 2022-06-19 ENCOUNTER — NON-APPOINTMENT (OUTPATIENT)
Age: 52
End: 2022-06-19

## 2022-06-22 ENCOUNTER — NON-APPOINTMENT (OUTPATIENT)
Age: 52
End: 2022-06-22

## 2022-07-04 ENCOUNTER — NON-APPOINTMENT (OUTPATIENT)
Age: 52
End: 2022-07-04

## 2022-08-05 ENCOUNTER — NON-APPOINTMENT (OUTPATIENT)
Age: 52
End: 2022-08-05

## 2022-11-09 ENCOUNTER — OFFICE (OUTPATIENT)
Dept: URBAN - METROPOLITAN AREA CLINIC 38 | Facility: CLINIC | Age: 52
Setting detail: OPHTHALMOLOGY
End: 2022-11-09
Payer: COMMERCIAL

## 2022-11-09 DIAGNOSIS — H43.811: ICD-10-CM

## 2022-11-09 PROCEDURE — 92014 COMPRE OPH EXAM EST PT 1/>: CPT | Performed by: OPHTHALMOLOGY

## 2022-11-09 ASSESSMENT — REFRACTION_AUTOREFRACTION
OD_CYLINDER: -0.75
OS_SPHERE: +0.25
OS_AXIS: 099
OD_SPHERE: +0.50
OS_CYLINDER: -0.50
OD_AXIS: 101

## 2022-11-09 ASSESSMENT — REFRACTION_CURRENTRX
OS_ADD: +2.00
OS_OVR_VA: 20/
OD_OVR_VA: 20/
OD_VPRISM_DIRECTION: SV
OD_ADD: +2.00
OS_VPRISM_DIRECTION: SV

## 2022-11-09 ASSESSMENT — VISUAL ACUITY
OS_BCVA: 20/20-2
OD_BCVA: 20/20

## 2022-11-09 ASSESSMENT — LID EXAM ASSESSMENTS
OD_BLEPHARITIS: T
OS_BLEPHARITIS: T

## 2022-11-09 ASSESSMENT — AXIALLENGTH_DERIVED
OS_AL: 22.2496
OD_AL: 22.5366

## 2022-11-09 ASSESSMENT — KERATOMETRY
OD_K2POWER_DIOPTERS: 46.50
OS_K2POWER_DIOPTERS: 47.75
OS_AXISANGLE_DEGREES: 092
OD_K1POWER_DIOPTERS: 46.25
OD_AXISANGLE_DEGREES: 047
OS_K1POWER_DIOPTERS: 47.00

## 2022-11-09 ASSESSMENT — CONFRONTATIONAL VISUAL FIELD TEST (CVF)
OS_FINDINGS: FULL
OD_FINDINGS: FULL

## 2022-11-09 ASSESSMENT — LID POSITION - PTOSIS: OD_PTOSIS: RUL 2+

## 2022-11-09 ASSESSMENT — SPHEQUIV_DERIVED
OS_SPHEQUIV: 0
OD_SPHEQUIV: 0.125

## 2022-11-22 ENCOUNTER — OFFICE (OUTPATIENT)
Dept: URBAN - METROPOLITAN AREA CLINIC 38 | Facility: CLINIC | Age: 52
Setting detail: OPHTHALMOLOGY
End: 2022-11-22
Payer: COMMERCIAL

## 2022-11-22 DIAGNOSIS — H43.813: ICD-10-CM

## 2022-11-22 PROCEDURE — 92014 COMPRE OPH EXAM EST PT 1/>: CPT | Performed by: OPHTHALMOLOGY

## 2022-11-22 ASSESSMENT — REFRACTION_CURRENTRX
OD_VPRISM_DIRECTION: SV
OD_OVR_VA: 20/
OS_VPRISM_DIRECTION: SV
OD_ADD: +2.00
OS_ADD: +2.00
OS_OVR_VA: 20/

## 2022-11-22 ASSESSMENT — REFRACTION_AUTOREFRACTION
OD_SPHERE: +0.50
OS_SPHERE: +0.25
OD_AXIS: 101
OD_CYLINDER: -0.75
OS_CYLINDER: -0.50
OS_AXIS: 099

## 2022-11-22 ASSESSMENT — LID EXAM ASSESSMENTS
OD_BLEPHARITIS: T
OS_BLEPHARITIS: T

## 2022-11-22 ASSESSMENT — KERATOMETRY
OS_K2POWER_DIOPTERS: 47.75
OD_K1POWER_DIOPTERS: 46.25
OS_K1POWER_DIOPTERS: 47.00
OS_AXISANGLE_DEGREES: 092
OD_K2POWER_DIOPTERS: 46.50
OD_AXISANGLE_DEGREES: 047

## 2022-11-22 ASSESSMENT — LID POSITION - PTOSIS: OD_PTOSIS: RUL 2+

## 2022-11-22 ASSESSMENT — VISUAL ACUITY
OD_BCVA: 20/20
OS_BCVA: 20/20-2

## 2022-11-22 ASSESSMENT — SPHEQUIV_DERIVED
OS_SPHEQUIV: 0
OD_SPHEQUIV: 0.125

## 2022-11-22 ASSESSMENT — AXIALLENGTH_DERIVED
OD_AL: 22.5366
OS_AL: 22.2496

## 2022-11-22 ASSESSMENT — CONFRONTATIONAL VISUAL FIELD TEST (CVF)
OS_FINDINGS: FULL
OD_FINDINGS: FULL

## 2023-06-05 ENCOUNTER — NON-APPOINTMENT (OUTPATIENT)
Age: 53
End: 2023-06-05

## 2023-10-28 ENCOUNTER — OFFICE (OUTPATIENT)
Dept: URBAN - METROPOLITAN AREA CLINIC 38 | Facility: CLINIC | Age: 53
Setting detail: OPHTHALMOLOGY
End: 2023-10-28
Payer: COMMERCIAL

## 2023-10-28 DIAGNOSIS — H43.813: ICD-10-CM

## 2023-10-28 DIAGNOSIS — H25.13: ICD-10-CM

## 2023-10-28 DIAGNOSIS — H02.401: ICD-10-CM

## 2023-10-28 PROBLEM — H52.4 PRESBYOPIA: Status: ACTIVE | Noted: 2023-10-28

## 2023-10-28 PROCEDURE — 92014 COMPRE OPH EXAM EST PT 1/>: CPT

## 2023-10-28 PROCEDURE — 92201 OPSCPY EXTND RTA DRAW UNI/BI: CPT

## 2023-10-28 ASSESSMENT — TONOMETRY
OS_IOP_MMHG: 19
OD_IOP_MMHG: 17

## 2023-10-28 ASSESSMENT — AXIALLENGTH_DERIVED
OD_AL: 22.7106
OS_AL: 22.288

## 2023-10-28 ASSESSMENT — REFRACTION_CURRENTRX
OD_ADD: +2.00
OD_OVR_VA: 20/
OD_VPRISM_DIRECTION: SV
OS_OVR_VA: 20/
OS_ADD: +2.00
OS_VPRISM_DIRECTION: SV

## 2023-10-28 ASSESSMENT — REFRACTION_AUTOREFRACTION
OS_AXIS: 075
OS_CYLINDER: -0.75
OD_AXIS: 100
OS_SPHERE: +0.50
OD_SPHERE: +0.25
OD_CYLINDER: -0.75

## 2023-10-28 ASSESSMENT — CONFRONTATIONAL VISUAL FIELD TEST (CVF)
OD_FINDINGS: FULL
OS_FINDINGS: FULL

## 2023-10-28 ASSESSMENT — KERATOMETRY
OS_K2POWER_DIOPTERS: 47.25
OS_K1POWER_DIOPTERS: 47.00
OD_K2POWER_DIOPTERS: 46.25
OD_AXISANGLE_DEGREES: 032
OD_K1POWER_DIOPTERS: 46.00
OS_AXISANGLE_DEGREES: 124
METHOD_AUTO_MANUAL: AUTO

## 2023-10-28 ASSESSMENT — LID POSITION - PTOSIS: OD_PTOSIS: RUL 2+

## 2023-10-28 ASSESSMENT — SPHEQUIV_DERIVED
OD_SPHEQUIV: -0.125
OS_SPHEQUIV: 0.125

## 2023-10-28 ASSESSMENT — VISUAL ACUITY
OS_BCVA: 20/30-
OD_BCVA: 20/20-2

## 2023-10-28 ASSESSMENT — LID EXAM ASSESSMENTS
OD_BLEPHARITIS: T
OS_BLEPHARITIS: T

## 2024-06-19 ENCOUNTER — APPOINTMENT (OUTPATIENT)
Dept: UROLOGY | Facility: CLINIC | Age: 54
End: 2024-06-19

## 2024-10-01 ENCOUNTER — APPOINTMENT (OUTPATIENT)
Dept: MAMMOGRAPHY | Facility: CLINIC | Age: 54
End: 2024-10-01

## 2024-10-02 ENCOUNTER — APPOINTMENT (OUTPATIENT)
Dept: ULTRASOUND IMAGING | Facility: CLINIC | Age: 54
End: 2024-10-02

## 2024-11-07 ENCOUNTER — APPOINTMENT (OUTPATIENT)
Dept: UROGYNECOLOGY | Facility: CLINIC | Age: 54
End: 2024-11-07

## 2024-11-07 DIAGNOSIS — R39.15 URGENCY OF URINATION: ICD-10-CM

## 2024-11-07 DIAGNOSIS — R33.9 RETENTION OF URINE, UNSPECIFIED: ICD-10-CM

## 2024-11-07 DIAGNOSIS — R19.8 OTHER SPECIFIED SYMPTOMS AND SIGNS INVOLVING THE DIGESTIVE SYSTEM AND ABDOMEN: ICD-10-CM

## 2024-11-07 DIAGNOSIS — N39.0 URINARY TRACT INFECTION, SITE NOT SPECIFIED: ICD-10-CM

## 2024-11-07 DIAGNOSIS — R82.90 UNSPECIFIED ABNORMAL FINDINGS IN URINE: ICD-10-CM

## 2024-11-07 DIAGNOSIS — N39.44 NOCTURNAL ENURESIS: ICD-10-CM

## 2024-11-07 PROCEDURE — 99204 OFFICE O/P NEW MOD 45 MIN: CPT | Mod: 25

## 2024-11-07 PROCEDURE — 81003 URINALYSIS AUTO W/O SCOPE: CPT | Mod: QW

## 2024-11-07 PROCEDURE — 99459 PELVIC EXAMINATION: CPT

## 2024-11-07 PROCEDURE — 51701 INSERT BLADDER CATHETER: CPT | Mod: 59

## 2024-11-08 LAB
APPEARANCE: CLEAR
BACTERIA: ABNORMAL /HPF
BILIRUBIN URINE: NEGATIVE
BLOOD URINE: NEGATIVE
CAST: 1 /LPF
COLOR: YELLOW
EPITHELIAL CELLS: 2 /HPF
GLUCOSE QUALITATIVE U: NEGATIVE MG/DL
KETONES URINE: NEGATIVE MG/DL
LEUKOCYTE ESTERASE URINE: ABNORMAL
MICROSCOPIC-UA: NORMAL
NITRITE URINE: POSITIVE
PH URINE: 6.5
PROTEIN URINE: NEGATIVE MG/DL
RED BLOOD CELLS URINE: 0 /HPF
SPECIFIC GRAVITY URINE: 1.02
UROBILINOGEN URINE: 0.2 MG/DL
WHITE BLOOD CELLS URINE: 4 /HPF

## 2024-11-11 DIAGNOSIS — N39.0 URINARY TRACT INFECTION, SITE NOT SPECIFIED: ICD-10-CM

## 2024-11-11 RX ORDER — CEPHALEXIN 500 MG/1
500 CAPSULE ORAL
Qty: 14 | Refills: 0 | Status: ACTIVE | COMMUNITY
Start: 2024-11-11 | End: 1900-01-01

## 2024-11-22 LAB
APPEARANCE: ABNORMAL
BACTERIA: ABNORMAL /HPF
BILIRUBIN URINE: NEGATIVE
BLOOD URINE: NEGATIVE
CAST: 0 /LPF
COLOR: YELLOW
EPITHELIAL CELLS: 1 /HPF
GLUCOSE QUALITATIVE U: NEGATIVE MG/DL
KETONES URINE: NEGATIVE MG/DL
LEUKOCYTE ESTERASE URINE: ABNORMAL
MICROSCOPIC-UA: NORMAL
NITRITE URINE: POSITIVE
PH URINE: 7
PROTEIN URINE: NEGATIVE MG/DL
RED BLOOD CELLS URINE: 0 /HPF
SPECIFIC GRAVITY URINE: 1.02
UROBILINOGEN URINE: 0.2 MG/DL
WHITE BLOOD CELLS URINE: 8 /HPF

## 2024-11-24 LAB — BACTERIA UR CULT: NORMAL

## 2024-11-25 RX ORDER — CEPHALEXIN 250 MG/1
250 CAPSULE ORAL
Qty: 90 | Refills: 0 | Status: ACTIVE | COMMUNITY
Start: 2024-11-25 | End: 1900-01-01

## 2024-11-25 RX ORDER — CEPHALEXIN 500 MG/1
500 CAPSULE ORAL
Qty: 14 | Refills: 0 | Status: ACTIVE | COMMUNITY
Start: 2024-11-25 | End: 1900-01-01

## 2024-12-06 ENCOUNTER — APPOINTMENT (OUTPATIENT)
Dept: UROGYNECOLOGY | Facility: CLINIC | Age: 54
End: 2024-12-06

## 2024-12-06 DIAGNOSIS — R33.9 RETENTION OF URINE, UNSPECIFIED: ICD-10-CM

## 2024-12-06 DIAGNOSIS — N39.0 URINARY TRACT INFECTION, SITE NOT SPECIFIED: ICD-10-CM

## 2024-12-06 DIAGNOSIS — R39.15 URGENCY OF URINATION: ICD-10-CM

## 2024-12-06 LAB
BILIRUB UR QL STRIP: NEGATIVE
CLARITY UR: CLEAR
COLLECTION METHOD: NORMAL
GLUCOSE UR-MCNC: NEGATIVE
HCG UR QL: 0.2 EU/DL
HGB UR QL STRIP.AUTO: NEGATIVE
KETONES UR-MCNC: NEGATIVE
LEUKOCYTE ESTERASE UR QL STRIP: NEGATIVE
NITRITE UR QL STRIP: NEGATIVE
PROT UR STRIP-MCNC: 6.5
SP GR UR STRIP: 1.02

## 2024-12-06 PROCEDURE — 51784 ANAL/URINARY MUSCLE STUDY: CPT

## 2024-12-06 PROCEDURE — 51741 ELECTRO-UROFLOWMETRY FIRST: CPT

## 2024-12-06 PROCEDURE — 51728 CYSTOMETROGRAM W/VP: CPT

## 2024-12-06 PROCEDURE — 51797 INTRAABDOMINAL PRESSURE TEST: CPT

## 2024-12-06 PROCEDURE — 81003 URINALYSIS AUTO W/O SCOPE: CPT | Mod: QW

## 2024-12-09 ENCOUNTER — APPOINTMENT (OUTPATIENT)
Dept: UROGYNECOLOGY | Facility: CLINIC | Age: 54
End: 2024-12-09

## 2024-12-09 LAB
APPEARANCE: CLEAR
BACTERIA UR CULT: NORMAL
BACTERIA: NEGATIVE /HPF
BILIRUBIN URINE: NEGATIVE
BLOOD URINE: NEGATIVE
CAST: 0 /LPF
COLOR: YELLOW
EPITHELIAL CELLS: 1 /HPF
GLUCOSE QUALITATIVE U: NEGATIVE MG/DL
KETONES URINE: NEGATIVE MG/DL
LEUKOCYTE ESTERASE URINE: NEGATIVE
MICROSCOPIC-UA: NORMAL
NITRITE URINE: NEGATIVE
PH URINE: 7.5
PROTEIN URINE: NEGATIVE MG/DL
RED BLOOD CELLS URINE: 0 /HPF
SPECIFIC GRAVITY URINE: 1.01
UROBILINOGEN URINE: 0.2 MG/DL
WHITE BLOOD CELLS URINE: 0 /HPF

## 2024-12-09 PROCEDURE — 99214 OFFICE O/P EST MOD 30 MIN: CPT

## 2024-12-26 ENCOUNTER — APPOINTMENT (OUTPATIENT)
Dept: UROGYNECOLOGY | Facility: CLINIC | Age: 54
End: 2024-12-26

## 2025-08-05 ENCOUNTER — APPOINTMENT (OUTPATIENT)
Dept: RADIOLOGY | Facility: CLINIC | Age: 55
End: 2025-08-05
Payer: COMMERCIAL

## 2025-08-05 PROCEDURE — 73000 X-RAY EXAM OF COLLAR BONE: CPT | Mod: LT

## 2025-08-19 ENCOUNTER — APPOINTMENT (OUTPATIENT)
Dept: ULTRASOUND IMAGING | Facility: CLINIC | Age: 55
End: 2025-08-19
Payer: COMMERCIAL

## 2025-08-19 ENCOUNTER — APPOINTMENT (OUTPATIENT)
Dept: MRI IMAGING | Facility: CLINIC | Age: 55
End: 2025-08-19

## 2025-08-19 PROCEDURE — 76536 US EXAM OF HEAD AND NECK: CPT

## 2025-08-29 ENCOUNTER — APPOINTMENT (OUTPATIENT)
Dept: UROGYNECOLOGY | Facility: CLINIC | Age: 55
End: 2025-08-29

## 2025-08-29 ENCOUNTER — RESULT CHARGE (OUTPATIENT)
Age: 55
End: 2025-08-29

## 2025-08-29 DIAGNOSIS — R39.15 URGENCY OF URINATION: ICD-10-CM

## 2025-08-29 DIAGNOSIS — N39.0 URINARY TRACT INFECTION, SITE NOT SPECIFIED: ICD-10-CM

## 2025-08-29 DIAGNOSIS — R33.9 RETENTION OF URINE, UNSPECIFIED: ICD-10-CM

## 2025-08-29 PROCEDURE — 51798 US URINE CAPACITY MEASURE: CPT

## 2025-08-29 PROCEDURE — 99214 OFFICE O/P EST MOD 30 MIN: CPT | Mod: 25

## 2025-08-29 PROCEDURE — 51701 INSERT BLADDER CATHETER: CPT

## 2025-08-29 RX ORDER — ESTRADIOL 0.1 MG/G
0.1 CREAM VAGINAL
Qty: 1 | Refills: 3 | Status: ACTIVE | COMMUNITY
Start: 2025-08-29 | End: 1900-01-01

## 2025-08-29 RX ORDER — METHENAMINE HIPPURATE 1 G/1
1 TABLET ORAL DAILY
Qty: 30 | Refills: 0 | Status: ACTIVE | COMMUNITY
Start: 2025-08-29 | End: 1900-01-01

## 2025-09-02 LAB
APPEARANCE: ABNORMAL
BACTERIA: ABNORMAL /HPF
BILIRUBIN URINE: NEGATIVE
BLOOD URINE: ABNORMAL
CAST: 0 /LPF
COLOR: YELLOW
EPITHELIAL CELLS: 0 /HPF
GLUCOSE QUALITATIVE U: NEGATIVE MG/DL
KETONES URINE: NEGATIVE MG/DL
LEUKOCYTE ESTERASE URINE: ABNORMAL
MICROSCOPIC-UA: NORMAL
NITRITE URINE: POSITIVE
PH URINE: 6
PROTEIN URINE: NEGATIVE MG/DL
RED BLOOD CELLS URINE: 1 /HPF
SPECIFIC GRAVITY URINE: 1.01
UROBILINOGEN URINE: 0.2 MG/DL
WHITE BLOOD CELLS URINE: 100 /HPF

## 2025-09-03 RX ORDER — NITROFURANTOIN (MONOHYDRATE/MACROCRYSTALS) 25; 75 MG/1; MG/1
100 CAPSULE ORAL TWICE DAILY
Qty: 14 | Refills: 0 | Status: ACTIVE | COMMUNITY
Start: 2025-09-03 | End: 1900-01-01